# Patient Record
Sex: FEMALE | Race: WHITE | NOT HISPANIC OR LATINO | Employment: OTHER | URBAN - METROPOLITAN AREA
[De-identification: names, ages, dates, MRNs, and addresses within clinical notes are randomized per-mention and may not be internally consistent; named-entity substitution may affect disease eponyms.]

---

## 2019-06-24 RX ORDER — MEMANTINE HYDROCHLORIDE 10 MG/1
10 TABLET ORAL DAILY
OUTPATIENT
Start: 2019-06-24

## 2019-06-24 RX ORDER — MEMANTINE HYDROCHLORIDE 10 MG/1
10 TABLET ORAL 2 TIMES DAILY
COMMUNITY

## 2019-06-26 ENCOUNTER — LAB REQUISITION (OUTPATIENT)
Dept: LAB | Facility: HOSPITAL | Age: 84
End: 2019-06-26

## 2019-06-26 DIAGNOSIS — Z00.00 ROUTINE GENERAL MEDICAL EXAMINATION AT A HEALTH CARE FACILITY: ICD-10-CM

## 2019-06-26 LAB — NT-PROBNP SERPL-MCNC: 445.3 PG/ML (ref 5–1800)

## 2019-06-26 PROCEDURE — 83880 ASSAY OF NATRIURETIC PEPTIDE: CPT

## 2019-08-06 ENCOUNTER — HOSPITAL ENCOUNTER (OUTPATIENT)
Facility: HOSPITAL | Age: 84
Setting detail: OBSERVATION
Discharge: NURSING, REHAB OR LONG-TERM CARE | End: 2019-08-07
Attending: EMERGENCY MEDICINE | Admitting: INTERNAL MEDICINE

## 2019-08-06 ENCOUNTER — APPOINTMENT (OUTPATIENT)
Dept: GENERAL RADIOLOGY | Facility: HOSPITAL | Age: 84
End: 2019-08-06

## 2019-08-06 DIAGNOSIS — R07.2 PRECORDIAL PAIN: Primary | ICD-10-CM

## 2019-08-06 LAB
ALBUMIN SERPL-MCNC: 3.7 G/DL (ref 3.5–4.8)
ALBUMIN/GLOB SERPL: 1.3 G/DL (ref 1–1.7)
ALP SERPL-CCNC: 112 U/L (ref 32–91)
ALT SERPL W P-5'-P-CCNC: 13 U/L (ref 14–54)
ANION GAP SERPL CALCULATED.3IONS-SCNC: 15.9 MMOL/L (ref 5–15)
AST SERPL-CCNC: 20 U/L (ref 15–41)
BASOPHILS # BLD AUTO: 0.1 10*3/MM3 (ref 0–0.2)
BASOPHILS NFR BLD AUTO: 0.8 % (ref 0–1.5)
BILIRUB SERPL-MCNC: 0.9 MG/DL (ref 0.3–1.2)
BUN BLD-MCNC: 21 MG/DL (ref 8–20)
BUN/CREAT SERPL: 19.1 (ref 5.4–26.2)
CALCIUM SPEC-SCNC: 8.9 MG/DL (ref 8.9–10.3)
CHLORIDE SERPL-SCNC: 97 MMOL/L (ref 101–111)
CO2 SERPL-SCNC: 25 MMOL/L (ref 22–32)
CREAT BLD-MCNC: 1.1 MG/DL (ref 0.4–1)
DEPRECATED RDW RBC AUTO: 47.3 FL (ref 37–54)
EOSINOPHIL # BLD AUTO: 0.3 10*3/MM3 (ref 0–0.4)
EOSINOPHIL NFR BLD AUTO: 4.5 % (ref 0.3–6.2)
ERYTHROCYTE [DISTWIDTH] IN BLOOD BY AUTOMATED COUNT: 14.6 % (ref 12.3–15.4)
GFR SERPL CREATININE-BSD FRML MDRD: 46 ML/MIN/1.73
GLOBULIN UR ELPH-MCNC: 2.8 GM/DL (ref 2.5–3.8)
GLUCOSE BLD-MCNC: 117 MG/DL (ref 65–99)
GLUCOSE BLDC GLUCOMTR-MCNC: 185 MG/DL (ref 70–105)
HCT VFR BLD AUTO: 47.8 % (ref 34–46.6)
HGB BLD-MCNC: 16.3 G/DL (ref 12–15.9)
INR PPP: 2.19 (ref 2–3)
LYMPHOCYTES # BLD AUTO: 1.5 10*3/MM3 (ref 0.7–3.1)
LYMPHOCYTES NFR BLD AUTO: 21.9 % (ref 19.6–45.3)
MCH RBC QN AUTO: 31.6 PG (ref 26.6–33)
MCHC RBC AUTO-ENTMCNC: 34.1 G/DL (ref 31.5–35.7)
MCV RBC AUTO: 92.8 FL (ref 79–97)
MONOCYTES # BLD AUTO: 0.5 10*3/MM3 (ref 0.1–0.9)
MONOCYTES NFR BLD AUTO: 7.3 % (ref 5–12)
NEUTROPHILS # BLD AUTO: 4.6 10*3/MM3 (ref 1.7–7)
NEUTROPHILS NFR BLD AUTO: 65.5 % (ref 42.7–76)
NRBC BLD AUTO-RTO: 0 /100 WBC (ref 0–0.2)
PLATELET # BLD AUTO: 192 10*3/MM3 (ref 140–450)
PMV BLD AUTO: 8 FL (ref 6–12)
POTASSIUM BLD-SCNC: 3.9 MMOL/L (ref 3.6–5.1)
PROT SERPL-MCNC: 6.5 G/DL (ref 6.1–7.9)
PROTHROMBIN TIME: 21.1 SECONDS (ref 19.4–28.5)
RBC # BLD AUTO: 5.15 10*6/MM3 (ref 3.77–5.28)
SODIUM BLD-SCNC: 134 MMOL/L (ref 136–144)
TROPONIN I SERPL-MCNC: <0.03 NG/ML (ref 0–0.03)
WBC NRBC COR # BLD: 7 10*3/MM3 (ref 3.4–10.8)

## 2019-08-06 PROCEDURE — 99284 EMERGENCY DEPT VISIT MOD MDM: CPT

## 2019-08-06 PROCEDURE — G0378 HOSPITAL OBSERVATION PER HR: HCPCS

## 2019-08-06 PROCEDURE — 80053 COMPREHEN METABOLIC PANEL: CPT | Performed by: EMERGENCY MEDICINE

## 2019-08-06 PROCEDURE — 99219 PR INITIAL OBSERVATION CARE/DAY 50 MINUTES: CPT | Performed by: INTERNAL MEDICINE

## 2019-08-06 PROCEDURE — 93010 ELECTROCARDIOGRAM REPORT: CPT | Performed by: INTERNAL MEDICINE

## 2019-08-06 PROCEDURE — 93005 ELECTROCARDIOGRAM TRACING: CPT | Performed by: EMERGENCY MEDICINE

## 2019-08-06 PROCEDURE — 85610 PROTHROMBIN TIME: CPT | Performed by: INTERNAL MEDICINE

## 2019-08-06 PROCEDURE — 82962 GLUCOSE BLOOD TEST: CPT

## 2019-08-06 PROCEDURE — 85025 COMPLETE CBC W/AUTO DIFF WBC: CPT | Performed by: EMERGENCY MEDICINE

## 2019-08-06 PROCEDURE — 84484 ASSAY OF TROPONIN QUANT: CPT | Performed by: EMERGENCY MEDICINE

## 2019-08-06 PROCEDURE — 71045 X-RAY EXAM CHEST 1 VIEW: CPT

## 2019-08-06 RX ORDER — ACETAMINOPHEN 325 MG/1
650 TABLET ORAL EVERY 6 HOURS PRN
Status: DISCONTINUED | OUTPATIENT
Start: 2019-08-06 | End: 2019-08-07 | Stop reason: HOSPADM

## 2019-08-06 RX ORDER — FAMOTIDINE 20 MG/1
20 TABLET, FILM COATED ORAL 2 TIMES DAILY
Status: DISCONTINUED | OUTPATIENT
Start: 2019-08-06 | End: 2019-08-07

## 2019-08-06 RX ORDER — LEVOTHYROXINE SODIUM 0.05 MG/1
50 TABLET ORAL DAILY
COMMUNITY

## 2019-08-06 RX ORDER — LEVOTHYROXINE SODIUM 0.05 MG/1
50 TABLET ORAL
Status: DISCONTINUED | OUTPATIENT
Start: 2019-08-07 | End: 2019-08-07 | Stop reason: HOSPADM

## 2019-08-06 RX ORDER — WARFARIN SODIUM 4 MG/1
4 TABLET ORAL
Status: DISCONTINUED | OUTPATIENT
Start: 2019-08-06 | End: 2019-08-07

## 2019-08-06 RX ORDER — DONEPEZIL HYDROCHLORIDE 5 MG/1
5 TABLET, FILM COATED ORAL NIGHTLY
COMMUNITY

## 2019-08-06 RX ORDER — SODIUM CHLORIDE 0.9 % (FLUSH) 0.9 %
3 SYRINGE (ML) INJECTION EVERY 12 HOURS SCHEDULED
Status: DISCONTINUED | OUTPATIENT
Start: 2019-08-06 | End: 2019-08-07 | Stop reason: HOSPADM

## 2019-08-06 RX ORDER — ACETAMINOPHEN 325 MG/1
650 TABLET ORAL EVERY 6 HOURS PRN
COMMUNITY

## 2019-08-06 RX ORDER — WARFARIN SODIUM 4 MG/1
4 TABLET ORAL
COMMUNITY

## 2019-08-06 RX ORDER — FUROSEMIDE 40 MG/1
40 TABLET ORAL DAILY
COMMUNITY

## 2019-08-06 RX ORDER — MEMANTINE HYDROCHLORIDE 10 MG/1
10 TABLET ORAL EVERY 24 HOURS
Status: DISCONTINUED | OUTPATIENT
Start: 2019-08-07 | End: 2019-08-07 | Stop reason: HOSPADM

## 2019-08-06 RX ORDER — ASPIRIN 81 MG/1
324 TABLET, CHEWABLE ORAL DAILY
Status: DISCONTINUED | OUTPATIENT
Start: 2019-08-06 | End: 2019-08-07 | Stop reason: HOSPADM

## 2019-08-06 RX ORDER — ASCORBIC ACID 500 MG
1000 TABLET ORAL DAILY
COMMUNITY

## 2019-08-06 RX ORDER — DOCUSATE SODIUM 100 MG/1
100 CAPSULE, LIQUID FILLED ORAL 2 TIMES DAILY PRN
Status: DISCONTINUED | OUTPATIENT
Start: 2019-08-06 | End: 2019-08-07 | Stop reason: HOSPADM

## 2019-08-06 RX ORDER — METOPROLOL SUCCINATE 50 MG/1
50 TABLET, EXTENDED RELEASE ORAL
Status: DISCONTINUED | OUTPATIENT
Start: 2019-08-07 | End: 2019-08-07 | Stop reason: HOSPADM

## 2019-08-06 RX ORDER — METOPROLOL SUCCINATE 50 MG/1
50 TABLET, EXTENDED RELEASE ORAL DAILY
COMMUNITY

## 2019-08-06 RX ORDER — POLYETHYLENE GLYCOL 3350 17 G/17G
17 POWDER, FOR SOLUTION ORAL DAILY PRN
COMMUNITY

## 2019-08-06 RX ORDER — LORATADINE 10 MG/1
10 TABLET ORAL DAILY
COMMUNITY

## 2019-08-06 RX ORDER — SODIUM CHLORIDE 0.9 % (FLUSH) 0.9 %
3-10 SYRINGE (ML) INJECTION AS NEEDED
Status: DISCONTINUED | OUTPATIENT
Start: 2019-08-06 | End: 2019-08-07 | Stop reason: HOSPADM

## 2019-08-06 RX ORDER — FUROSEMIDE 40 MG/1
40 TABLET ORAL DAILY
Status: DISCONTINUED | OUTPATIENT
Start: 2019-08-07 | End: 2019-08-07 | Stop reason: HOSPADM

## 2019-08-06 RX ORDER — WARFARIN SODIUM 4 MG/1
4 TABLET ORAL
Status: DISCONTINUED | OUTPATIENT
Start: 2019-08-07 | End: 2019-08-07 | Stop reason: HOSPADM

## 2019-08-06 RX ORDER — WARFARIN SODIUM 5 MG/1
5 TABLET ORAL
Status: DISCONTINUED | OUTPATIENT
Start: 2019-08-06 | End: 2019-08-06

## 2019-08-06 RX ADMIN — FAMOTIDINE 20 MG: 20 TABLET, FILM COATED ORAL at 20:59

## 2019-08-06 RX ADMIN — ASPIRIN 81 MG 324 MG: 81 TABLET ORAL at 17:55

## 2019-08-06 RX ADMIN — Medication 3 ML: at 21:01

## 2019-08-06 NOTE — H&P
"[facility] HOSPITALIST     Nellie Mcclendon MD    CHIEF COMPLAINT:     CHEST PAIN    HISTORY OF PRESENT ILLNESS:    pT C/O 1 HR OF 10/10 EPIGASTRIC PAIN EARLIER TODAY. nO AGGRAVATING OR ALLEVIATING FACTORS. pOS soa, no n/v, nO DIAPHORESIS. pT MAY HAVE H/O AFIB      No past medical history on file.  No past surgical history on file.  No family history on file.  Social History     Tobacco Use   • Smoking status: Not on file   Substance Use Topics   • Alcohol use: Not on file   • Drug use: Not on file       (Not in a hospital admission)  Allergies:  Nitrofurantoin      There is no immunization history on file for this patient.        REVIEW OF SYSTEMS:  Please see the above history of present illness for pertinent positives and negatives.  The remainder of the patient's systems have been reviewed and are negative.   Sh- nO TOB OR ALCOHOL  FH- none per pt    Vital Signs  Temp:  [97.7 °F (36.5 °C)] 97.7 °F (36.5 °C)  Heart Rate:  [62] 62  Resp:  [15] 15  BP: (156-173)/(70-79) 156/70    Flowsheet Rows      First Filed Value   Admission Height  170.2 cm (67\") Documented at 08/06/2019 1256   Admission Weight  68 kg (150 lb) Documented at 08/06/2019 1256           Physical Exam:  Physical Exam   Constitutional: Patient appears well-developed and well-nourished and in no acute distress     HEENT:   Head: Normocephalic and atraumatic.   Eyes:  Pupils are equal, round, and reactive to light. EOM are intact. Sclera are anicteric and non-injected.  Mouth and Throat: Patient has moist mucous membranes. Oropharynx is clear of any erythema or exudate.       Neck: Neck supple.  No thyromegaly present. No lymphadenopathy present. No  masses.     Cardiovascular: Inspection: No JVD present. Palpation: No parasternal heave. Pedal pulses +1 bilaterally. No leg edema. Auscultation: Regular rate, regular rhythm, S1 normal and S2 normal. reveals no gallop and no friction rub. No Carotid bruit " bilaterally.    Pulmonary/Chest: Inspection: No distress, no use of accessory muscles. Lungs are clear to auscultation bilaterally. No respiratory distress. No wheezes. No rhonchi. No rales.     Abdomen /Gastrointestinal: Inspection: no distension. Palpation: no masses, no organomegaly. Soft. There is no tenderness. Bowel sounds are normal.     Musculoskeletal: Normal Muscle tone. Age appropriate, no deformities.    Neurological: Patient is alert and oriented to person, place, and time. Cranial nerves II-XII are grossly intact with no focal deficits. Sensori-motor exam is normal. No cerebellar signs.    Skin: Skin is warm. No rash noted. Nails show no clubbing.  No cyanosis or erythema. No bruising.      Results Review:      Lab Results (most recent)     Procedure Component Value Units Date/Time    Troponin [[id number]]  (Normal) Collected:  08/06/19 1350    Specimen:  Blood Updated:  08/06/19 1426     Troponin I <0.030 ng/mL     Narrative:       Troponin I Reference Range:    0.00-0.03  Negative.  Repeat testing in 4-6 hours if clinically indicated.    0.04-0.29  Suspicious for myocardial injury. Serial measurements and clinical  correlation may be necessary to confirm or exclude diagnosis of acute  coronary syndrome.  Repeat testing in 4-6 hours if indicated.     >0.29 Consistent with myocardial injury.  Recommend clinical and laboratory correlation.     Results my be falsely decreased if patient taking Biotin.     Comprehensive Metabolic Panel [[id number]]  (Abnormal) Collected:  08/06/19 1350    Specimen:  Blood Updated:  08/06/19 1424     Glucose 117 mg/dL      BUN 21 mg/dL      Creatinine 1.10 mg/dL      Sodium 134 mmol/L      Potassium 3.9 mmol/L      Chloride 97 mmol/L      CO2 25.0 mmol/L      Calcium 8.9 mg/dL      Total Protein 6.5 g/dL      Albumin 3.70 g/dL      ALT (SGPT) 13 U/L      AST (SGOT) 20 U/L      Alkaline Phosphatase 112 U/L      Total Bilirubin 0.9 mg/dL      eGFR Non  Amer 46  mL/min/1.73      Globulin 2.8 gm/dL      A/G Ratio 1.3 g/dL      BUN/Creatinine Ratio 19.1     Anion Gap 15.9 mmol/L     Narrative:       The MDRD GFR formula is only valid for adults with stable renal function between ages 18 and 70.    CBC & Differential [[id number]] Collected:  08/06/19 1350    Specimen:  Blood Updated:  08/06/19 1413    Narrative:       The following orders were created for panel order CBC & Differential.  Procedure                               Abnormality         Status                     ---------                               -----------         ------                     CBC Auto Differential[[id number]]        Abnormal            Final result                 Please view results for these tests on the individual orders.    CBC Auto Differential [[id number]]  (Abnormal) Collected:  08/06/19 1350    Specimen:  Blood Updated:  08/06/19 1413     WBC 7.00 10*3/mm3      Comment: Result checked         RBC 5.15 10*6/mm3      Hemoglobin 16.3 g/dL      Hematocrit 47.8 %      MCV 92.8 fL      MCH 31.6 pg      MCHC 34.1 g/dL      RDW 14.6 %      RDW-SD 47.3 fl      MPV 8.0 fL      Platelets 192 10*3/mm3      Neutrophil % 65.5 %      Lymphocyte % 21.9 %      Monocyte % 7.3 %      Eosinophil % 4.5 %      Basophil % 0.8 %      Neutrophils, Absolute 4.60 10*3/mm3      Lymphocytes, Absolute 1.50 10*3/mm3      Monocytes, Absolute 0.50 10*3/mm3      Eosinophils, Absolute 0.30 10*3/mm3      Basophils, Absolute 0.10 10*3/mm3      nRBC 0.0 /100 WBC           Imaging Results (most recent)     Procedure Component Value Units Date/Time    XR Chest 1 View [[id number]] Collected:  08/06/19 1424     Updated:  08/06/19 1428    Narrative:       DATE OF EXAM:  8/6/2019 2:19 PM     PROCEDURE:  XR CHEST 1 VW-     INDICATIONS:  Chest pain and shortness of breath     COMPARISON:  No Comparisons Available     TECHNIQUE:   Single radiographic AP view of the chest was obtained.     FINDINGS:  Cardiac size is enlarged. The  pulmonary vascular markings of the chest  appear normal. Lungs appear clear with no acute infiltrates. There is  biapical pleural thickening present.        Impression:       Mild cardiomegaly with evidence of biapical pleural thickening. No acute  process identified.     Electronically Signed By-Man Andrews On:8/6/2019 2:26 PM  This report was finalized on [id number] by  Man Andrews, .          ECG/EMG Results (most recent)     Procedure Component Value Units Date/Time    ECG 12 Lead [[id number]] Collected:  08/06/19 1303     Updated:  08/06/19 1304    Narrative:       HEART RATE= 61  bpm  RR Interval= 988  ms  TN Interval= 214  ms  P Horizontal Axis= -25  deg  P Front Axis= 87  deg  QRSD Interval= 92  ms  QT Interval= 430  ms  QRS Axis= 79  deg  T Wave Axis= 56  deg  - ABNORMAL ECG -  Sinus rhythm  Borderline prolonged TN interval  Anterior infarct, old  Electronically Signed By:   Date and Time of Study: 2019-08-06 13:03:12          I reviewed the patient's new clinical results.    Assessment/Plan     Active Problems:    Precordial pain- card consult, asa, prn ntg   Hypothyroid- chk tsh  ?afib- chk INR and cont coumadin if therapeutic  S/p Latrice           I discussed the patients findings and my recommendations with patient    Herber Hoyt Jr., MD  08/06/19  3:55 PM    Time:

## 2019-08-06 NOTE — ED PROVIDER NOTES
Subjective   History of Present Illness  Context: 89-year-old female presents with chest pain.  She describes as mid chest very severe.  She states that lasted a few minutes.  She states that disease at this time.  She had no associated symptoms of shortness of breath nausea diaphoresis or radiation of pain.  She states she has never had any like this previously  Location: Chest  Quality: Pain  Duration: Shortly before arrival  Timing: Resolved at this time  Severity: Was severe   Modifying factors: None reported  Associated signs and symptoms:    Review of Systems   Constitutional: Negative for fever and unexpected weight change.   HENT: Positive for hearing loss. Negative for congestion and sore throat.    Eyes: Negative for pain.   Respiratory: Negative for cough and shortness of breath.    Cardiovascular: Positive for chest pain. Negative for leg swelling.   Gastrointestinal: Negative for abdominal pain, diarrhea and vomiting.   Endocrine: Negative for polydipsia.   Genitourinary: Negative for dysuria and urgency.   Musculoskeletal: Negative for back pain.   Skin: Negative for rash.   Neurological: Negative for dizziness, weakness and headaches.   Psychiatric/Behavioral: Negative for confusion.       No past medical history on file.  Hypertension atrial fibrillation hyperlipidemia dementia, reflux, hypothyroid  Allergies   Allergen Reactions   • Nitrofurantoin Nausea And Vomiting       No past surgical history on file.    No family history on file.    Social History     Socioeconomic History   • Marital status:      Spouse name: Not on file   • Number of children: Not on file   • Years of education: Not on file   • Highest education level: Not on file   Medications amlodipine vitamin C vitamin D Colace Aricept Lasix Synthroid Claritin Namenda Toprol GlycoLax and Coumadin        Objective   Physical Exam  89 y.o. female Generally well-developed well-nourished,  Pupils equal round react to light.   Extraocular muscles intact, sclera nonicteric  Oropharynx clear, mucous membranes moist,   neck supple no JVD  Cardiovascular regular rate and rhythm without murmur gallop rub appreciated,  Respiratory lungs clear with equal breath sounds bilaterally  Abdomen soft and nontender without mass rebound or guarding  Extremities without tenderness trace symmetric edema  Neurologic without obvious focal findings noted motor sensory grossly intact extremities  Psychiatric cooperative  Dermatologic no significant rash or bruising noted    Procedures           ED Course      Results for orders placed or performed during the hospital encounter of 08/06/19   Comprehensive Metabolic Panel   Result Value Ref Range    Glucose 117 (H) 65 - 99 mg/dL    BUN 21 (H) 8 - 20 mg/dL    Creatinine 1.10 (H) 0.40 - 1.00 mg/dL    Sodium 134 (L) 136 - 144 mmol/L    Potassium 3.9 3.6 - 5.1 mmol/L    Chloride 97 (L) 101 - 111 mmol/L    CO2 25.0 22.0 - 32.0 mmol/L    Calcium 8.9 8.9 - 10.3 mg/dL    Total Protein 6.5 6.1 - 7.9 g/dL    Albumin 3.70 3.50 - 4.80 g/dL    ALT (SGPT) 13 (L) 14 - 54 U/L    AST (SGOT) 20 15 - 41 U/L    Alkaline Phosphatase 112 (H) 32 - 91 U/L    Total Bilirubin 0.9 0.3 - 1.2 mg/dL    eGFR Non African Amer 46 (L) >60 mL/min/1.73    Globulin 2.8 2.5 - 3.8 gm/dL    A/G Ratio 1.3 1.0 - 1.7 g/dL    BUN/Creatinine Ratio 19.1 5.4 - 26.2    Anion Gap 15.9 (H) 5.0 - 15.0 mmol/L   Troponin   Result Value Ref Range    Troponin I <0.030 0.000 - 0.030 ng/mL   CBC Auto Differential   Result Value Ref Range    WBC 7.00 3.40 - 10.80 10*3/mm3    RBC 5.15 3.77 - 5.28 10*6/mm3    Hemoglobin 16.3 (H) 12.0 - 15.9 g/dL    Hematocrit 47.8 (H) 34.0 - 46.6 %    MCV 92.8 79.0 - 97.0 fL    MCH 31.6 26.6 - 33.0 pg    MCHC 34.1 31.5 - 35.7 g/dL    RDW 14.6 12.3 - 15.4 %    RDW-SD 47.3 37.0 - 54.0 fl    MPV 8.0 6.0 - 12.0 fL    Platelets 192 140 - 450 10*3/mm3    Neutrophil % 65.5 42.7 - 76.0 %    Lymphocyte % 21.9 19.6 - 45.3 %    Monocyte % 7.3 5.0 -  "12.0 %    Eosinophil % 4.5 0.3 - 6.2 %    Basophil % 0.8 0.0 - 1.5 %    Neutrophils, Absolute 4.60 1.70 - 7.00 10*3/mm3    Lymphocytes, Absolute 1.50 0.70 - 3.10 10*3/mm3    Monocytes, Absolute 0.50 0.10 - 0.90 10*3/mm3    Eosinophils, Absolute 0.30 0.00 - 0.40 10*3/mm3    Basophils, Absolute 0.10 0.00 - 0.20 10*3/mm3    nRBC 0.0 0.0 - 0.2 /100 WBC     Xr Chest 1 View    Result Date: 8/6/2019  Mild cardiomegaly with evidence of biapical pleural thickening. No acute process identified.  Electronically Signed By-Man Andrews On:8/6/2019 2:26 PM This report was finalized on [id number] by  Man Andrews, .    Medications - No data to display  /79 (BP Location: Right arm, Patient Position: Sitting)   Pulse 62   Temp 97.7 °F (36.5 °C) (Oral)   Resp 15   Ht 170.2 cm (67\")   Wt 68 kg (150 lb)   SpO2 98%   BMI 23.49 kg/m²   No significant laboratory abnormality, negative troponin            MDM  Chart review: Most recent cardiology office visit last month she had been seen for dizziness low blood pressure.  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  Compared to previous rate decreased otherwise no significant change  Lab: No significant laboratory abnormality negative troponin  Radiology: I reviewed chest x-ray.  Mild cardiomegaly with biapical pleural thickening without evidence of acute cardiopulmonary abnormality Discussion/treatment: findings were discussed with patient family.  She was discussed with hospitalist.  She will brought in for observation.  She will have further cardiac testing as indicated      Final diagnoses:   Precordial pain            Victorino Gallegos MD  08/06/19 1518    "

## 2019-08-06 NOTE — ED NOTES
Episode of cp earlier this afternoon. Pt st she has never had cp before. Pt st she does not have any cp at this time or any other symptoms      Gabriella Rao RN  08/06/19 [id number]

## 2019-08-07 ENCOUNTER — APPOINTMENT (OUTPATIENT)
Dept: NUCLEAR MEDICINE | Facility: HOSPITAL | Age: 84
End: 2019-08-07

## 2019-08-07 VITALS
DIASTOLIC BLOOD PRESSURE: 79 MMHG | TEMPERATURE: 97.3 F | OXYGEN SATURATION: 95 % | RESPIRATION RATE: 16 BRPM | WEIGHT: 145.72 LBS | HEART RATE: 65 BPM | SYSTOLIC BLOOD PRESSURE: 159 MMHG | BODY MASS INDEX: 22.87 KG/M2 | HEIGHT: 67 IN

## 2019-08-07 LAB
ALBUMIN SERPL-MCNC: 3.2 G/DL (ref 3.5–4.8)
ALBUMIN/GLOB SERPL: 1.3 G/DL (ref 1–1.7)
ALP SERPL-CCNC: 99 U/L (ref 32–91)
ALT SERPL W P-5'-P-CCNC: 12 U/L (ref 14–54)
ANION GAP SERPL CALCULATED.3IONS-SCNC: 16.8 MMOL/L (ref 5–15)
AST SERPL-CCNC: 16 U/L (ref 15–41)
BASOPHILS # BLD MANUAL: 0.06 10*3/MM3 (ref 0–0.2)
BASOPHILS NFR BLD AUTO: 1 % (ref 0–1.5)
BILIRUB SERPL-MCNC: 1 MG/DL (ref 0.3–1.2)
BUN BLD-MCNC: 22 MG/DL (ref 8–20)
BUN/CREAT SERPL: 24.4 (ref 5.4–26.2)
CALCIUM SPEC-SCNC: 8.4 MG/DL (ref 8.9–10.3)
CHLORIDE SERPL-SCNC: 98 MMOL/L (ref 101–111)
CO2 SERPL-SCNC: 24 MMOL/L (ref 22–32)
CREAT BLD-MCNC: 0.9 MG/DL (ref 0.4–1)
DEPRECATED RDW RBC AUTO: 45.1 FL (ref 37–54)
EOSINOPHIL # BLD MANUAL: 0.24 10*3/MM3 (ref 0–0.4)
EOSINOPHIL NFR BLD MANUAL: 4 % (ref 0.3–6.2)
ERYTHROCYTE [DISTWIDTH] IN BLOOD BY AUTOMATED COUNT: 14.1 % (ref 12.3–15.4)
GFR SERPL CREATININE-BSD FRML MDRD: 58 ML/MIN/1.73
GLOBULIN UR ELPH-MCNC: 2.5 GM/DL (ref 2.5–3.8)
GLUCOSE BLD-MCNC: 122 MG/DL (ref 65–99)
GLUCOSE BLDC GLUCOMTR-MCNC: 124 MG/DL (ref 70–105)
HCT VFR BLD AUTO: 43.9 % (ref 34–46.6)
HGB BLD-MCNC: 15 G/DL (ref 12–15.9)
INR PPP: 2.07 (ref 2–3)
LYMPHOCYTES # BLD MANUAL: 1.83 10*3/MM3 (ref 0.7–3.1)
LYMPHOCYTES NFR BLD MANUAL: 30 % (ref 19.6–45.3)
LYMPHOCYTES NFR BLD MANUAL: 7 % (ref 5–12)
MCH RBC QN AUTO: 31.4 PG (ref 26.6–33)
MCHC RBC AUTO-ENTMCNC: 34.2 G/DL (ref 31.5–35.7)
MCV RBC AUTO: 91.9 FL (ref 79–97)
MONOCYTES # BLD AUTO: 0.43 10*3/MM3 (ref 0.1–0.9)
NEUTROPHILS # BLD AUTO: 3.48 10*3/MM3 (ref 1.7–7)
NEUTROPHILS NFR BLD MANUAL: 55 % (ref 42.7–76)
NEUTS BAND NFR BLD MANUAL: 2 % (ref 0–5)
PLAT MORPH BLD: NORMAL
PLATELET # BLD AUTO: 164 10*3/MM3 (ref 140–450)
PMV BLD AUTO: 8.5 FL (ref 6–12)
POTASSIUM BLD-SCNC: 3.8 MMOL/L (ref 3.6–5.1)
PROT SERPL-MCNC: 5.7 G/DL (ref 6.1–7.9)
PROTHROMBIN TIME: 20 SECONDS (ref 19.4–28.5)
RBC # BLD AUTO: 4.78 10*6/MM3 (ref 3.77–5.28)
RBC MORPH BLD: NORMAL
SCAN SLIDE: NORMAL
SODIUM BLD-SCNC: 135 MMOL/L (ref 136–144)
TROPONIN I SERPL-MCNC: <0.03 NG/ML (ref 0–0.03)
TROPONIN I SERPL-MCNC: <0.03 NG/ML (ref 0–0.03)
TSH SERPL DL<=0.05 MIU/L-ACNC: 3.26 MIU/ML (ref 0.34–5.6)
VARIANT LYMPHS NFR BLD MANUAL: 1 % (ref 0–5)
WBC MORPH BLD: NORMAL
WBC NRBC COR # BLD: 6.1 10*3/MM3 (ref 3.4–10.8)

## 2019-08-07 PROCEDURE — A9500 TC99M SESTAMIBI: HCPCS | Performed by: INTERNAL MEDICINE

## 2019-08-07 PROCEDURE — 85025 COMPLETE CBC W/AUTO DIFF WBC: CPT | Performed by: INTERNAL MEDICINE

## 2019-08-07 PROCEDURE — 84484 ASSAY OF TROPONIN QUANT: CPT | Performed by: INTERNAL MEDICINE

## 2019-08-07 PROCEDURE — 84484 ASSAY OF TROPONIN QUANT: CPT | Performed by: NURSE PRACTITIONER

## 2019-08-07 PROCEDURE — G0378 HOSPITAL OBSERVATION PER HR: HCPCS

## 2019-08-07 PROCEDURE — 93005 ELECTROCARDIOGRAM TRACING: CPT | Performed by: INTERNAL MEDICINE

## 2019-08-07 PROCEDURE — 93017 CV STRESS TEST TRACING ONLY: CPT

## 2019-08-07 PROCEDURE — 84443 ASSAY THYROID STIM HORMONE: CPT | Performed by: INTERNAL MEDICINE

## 2019-08-07 PROCEDURE — 85610 PROTHROMBIN TIME: CPT | Performed by: INTERNAL MEDICINE

## 2019-08-07 PROCEDURE — 99217 PR OBSERVATION CARE DISCHARGE MANAGEMENT: CPT | Performed by: INTERNAL MEDICINE

## 2019-08-07 PROCEDURE — 85007 BL SMEAR W/DIFF WBC COUNT: CPT | Performed by: INTERNAL MEDICINE

## 2019-08-07 PROCEDURE — 99203 OFFICE O/P NEW LOW 30 MIN: CPT | Performed by: INTERNAL MEDICINE

## 2019-08-07 PROCEDURE — 78452 HT MUSCLE IMAGE SPECT MULT: CPT

## 2019-08-07 PROCEDURE — 80053 COMPREHEN METABOLIC PANEL: CPT | Performed by: INTERNAL MEDICINE

## 2019-08-07 PROCEDURE — 82962 GLUCOSE BLOOD TEST: CPT

## 2019-08-07 PROCEDURE — 0 TECHNETIUM SESTAMIBI: Performed by: INTERNAL MEDICINE

## 2019-08-07 RX ORDER — NITROGLYCERIN 0.4 MG/1
0.4 TABLET SUBLINGUAL
Status: DISCONTINUED | OUTPATIENT
Start: 2019-08-07 | End: 2019-08-07 | Stop reason: HOSPADM

## 2019-08-07 RX ORDER — FAMOTIDINE 20 MG/1
20 TABLET, FILM COATED ORAL DAILY
Status: DISCONTINUED | OUTPATIENT
Start: 2019-08-08 | End: 2019-08-07 | Stop reason: HOSPADM

## 2019-08-07 RX ADMIN — TECHNETIUM TC 99M SESTAMIBI 1 DOSE: 1 INJECTION INTRAVENOUS at 10:45

## 2019-08-07 RX ADMIN — ASPIRIN 81 MG 324 MG: 81 TABLET ORAL at 09:00

## 2019-08-07 RX ADMIN — NITROGLYCERIN 0.4 MG: 0.4 TABLET SUBLINGUAL at 01:11

## 2019-08-07 RX ADMIN — MEMANTINE 10 MG: 10 TABLET ORAL at 09:00

## 2019-08-07 RX ADMIN — LEVOTHYROXINE SODIUM 50 MCG: 50 TABLET ORAL at 06:16

## 2019-08-07 RX ADMIN — Medication 3 ML: at 09:02

## 2019-08-07 RX ADMIN — FAMOTIDINE 20 MG: 20 TABLET, FILM COATED ORAL at 09:01

## 2019-08-07 RX ADMIN — FUROSEMIDE 40 MG: 40 TABLET ORAL at 09:00

## 2019-08-07 NOTE — DISCHARGE SUMMARY
Date of Admission: 8/6/2019    Date of Discharge:  8/7/2019    Length of stay:  LOS: 0 days     Discharge Diagnosis: chest pain    Presenting Problem/History of Present Illness  Active Hospital Problems    Diagnosis  POA   • **Precordial pain [R07.2]  Yes      Resolved Hospital Problems   No resolved problems to display.          Hospital Course  Patient is a 89 y.o. female presented with chest pain and ruled out for mi and cadiology said she could be discharged.      Past Medical History:     Past Medical History:   Diagnosis Date   • Acid reflux    • Arthritis    • Atrial fibrillation (CMS/HCC)    • Atrial fibrillation (CMS/HCC) unknown   • Dementia        Past Surgical History:   History reviewed. No pertinent surgical history.    Social History:   Social History     Socioeconomic History   • Marital status:      Spouse name: Not on file   • Number of children: Not on file   • Years of education: Not on file   • Highest education level: Not on file   Tobacco Use   • Smoking status: Never Smoker   • Smokeless tobacco: Never Used   Substance and Sexual Activity   • Alcohol use: No     Frequency: Never   • Drug use: No       Procedures Performed         Consults:   Consults     Date and Time Order Name Status Description    8/6/2019 1631 Inpatient Cardiology Consult Completed     8/6/2019 1519 Hospitalist (on-call MD unless specified) Completed           Pertinent Test Results:       Lab Results (most recent)     Procedure Component Value Units Date/Time    POC Glucose Once [[id number]]  (Abnormal) Collected:  08/07/19 0735    Specimen:  Blood Updated:  08/07/19 0736     Glucose 124 mg/dL      Comment: Serial Number: [id number]:  [id number]       Troponin [[id number]]  (Normal) Collected:  08/07/19 0428    Specimen:  Blood Updated:  08/07/19 0551     Troponin I <0.030 ng/mL     Narrative:       Troponin I Reference Range:    0.00-0.03  Negative.  Repeat testing in 4-6 hours if clinically  indicated.    0.04-0.29  Suspicious for myocardial injury. Serial measurements and clinical  correlation may be necessary to confirm or exclude diagnosis of acute  coronary syndrome.  Repeat testing in 4-6 hours if indicated.     >0.29 Consistent with myocardial injury.  Recommend clinical and laboratory correlation.     Results my be falsely decreased if patient taking Biotin.     CBC & Differential [[id number]] Collected:  08/07/19 0120    Specimen:  Blood Updated:  08/07/19 0224    Narrative:       The following orders were created for panel order CBC & Differential.  Procedure                               Abnormality         Status                     ---------                               -----------         ------                     Scan Slide[[id number]]                                       Final result               CBC Auto Differential[[id number]]        Normal              Final result                 Please view results for these tests on the individual orders.    CBC Auto Differential [[id number]]  (Normal) Collected:  08/07/19 0120    Specimen:  Blood Updated:  08/07/19 0224     WBC 6.10 10*3/mm3      RBC 4.78 10*6/mm3      Hemoglobin 15.0 g/dL      Hematocrit 43.9 %      MCV 91.9 fL      MCH 31.4 pg      MCHC 34.2 g/dL      RDW 14.1 %      RDW-SD 45.1 fl      MPV 8.5 fL      Platelets 164 10*3/mm3     Scan Slide [[id number]] Collected:  08/07/19 0120    Specimen:  Blood Updated:  08/07/19 0224     Scan Slide --     Comment: See Manual Differential Results       Manual Differential [[id number]]  (Normal) Collected:  08/07/19 0120    Specimen:  Blood Updated:  08/07/19 0224     Neutrophil % 55.0 %      Lymphocyte % 30.0 %      Monocyte % 7.0 %      Eosinophil % 4.0 %      Basophil % 1.0 %      Bands %  2.0 %      Atypical Lymphocyte % 1.0 %      Neutrophils Absolute 3.48 10*3/mm3      Lymphocytes Absolute 1.83 10*3/mm3      Monocytes Absolute 0.43 10*3/mm3      Eosinophils Absolute 0.24 10*3/mm3       Basophils Absolute 0.06 10*3/mm3      RBC Morphology Normal     WBC Morphology Normal     Platelet Morphology Normal    TSH [[id number]]  (Normal) Collected:  08/07/19 0120    Specimen:  Blood Updated:  08/07/19 0212     TSH 3.260 mIU/mL      Comment: Results may be falsely decreased if patient taking Biotin.       Troponin [[id number]]  (Normal) Collected:  08/07/19 0120    Specimen:  Blood Updated:  08/07/19 0200     Troponin I <0.030 ng/mL     Narrative:       Troponin I Reference Range:    0.00-0.03  Negative.  Repeat testing in 4-6 hours if clinically indicated.    0.04-0.29  Suspicious for myocardial injury. Serial measurements and clinical  correlation may be necessary to confirm or exclude diagnosis of acute  coronary syndrome.  Repeat testing in 4-6 hours if indicated.     >0.29 Consistent with myocardial injury.  Recommend clinical and laboratory correlation.     Results my be falsely decreased if patient taking Biotin.     Comprehensive Metabolic Panel [[id number]]  (Abnormal) Collected:  08/07/19 0120    Specimen:  Blood Updated:  08/07/19 0200     Glucose 122 mg/dL      BUN 22 mg/dL      Creatinine 0.90 mg/dL      Sodium 135 mmol/L      Potassium 3.8 mmol/L      Chloride 98 mmol/L      CO2 24.0 mmol/L      Calcium 8.4 mg/dL      Total Protein 5.7 g/dL      Albumin 3.20 g/dL      ALT (SGPT) 12 U/L      AST (SGOT) 16 U/L      Alkaline Phosphatase 99 U/L      Total Bilirubin 1.0 mg/dL      eGFR Non African Amer 58 mL/min/1.73      Globulin 2.5 gm/dL      A/G Ratio 1.3 g/dL      BUN/Creatinine Ratio 24.4     Anion Gap 16.8 mmol/L     Narrative:       The MDRD GFR formula is only valid for adults with stable renal function between ages 18 and 70.    Protime-INR [[id number]]  (Normal) Collected:  08/07/19 0120    Specimen:  Blood Updated:  08/07/19 0143     Protime 20.0 Seconds      INR 2.07    POC Glucose Once [[id number]]  (Abnormal) Collected:  08/06/19 1955    Specimen:  Blood Updated:  08/06/19 1956      Glucose 185 mg/dL      Comment: Serial Number: [id number]:  [id number]       Protime-INR [[id number]]  (Normal) Collected:  08/06/19 1732    Specimen:  Blood Updated:  08/06/19 1942     Protime 21.1 Seconds      INR 2.19    Comprehensive Metabolic Panel [[id number]]  (Abnormal) Collected:  08/06/19 1350    Specimen:  Blood Updated:  08/06/19 1424     Glucose 117 mg/dL      BUN 21 mg/dL      Creatinine 1.10 mg/dL      Sodium 134 mmol/L      Potassium 3.9 mmol/L      Chloride 97 mmol/L      CO2 25.0 mmol/L      Calcium 8.9 mg/dL      Total Protein 6.5 g/dL      Albumin 3.70 g/dL      ALT (SGPT) 13 U/L      AST (SGOT) 20 U/L      Alkaline Phosphatase 112 U/L      Total Bilirubin 0.9 mg/dL      eGFR Non African Amer 46 mL/min/1.73      Globulin 2.8 gm/dL      A/G Ratio 1.3 g/dL      BUN/Creatinine Ratio 19.1     Anion Gap 15.9 mmol/L     Narrative:       The MDRD GFR formula is only valid for adults with stable renal function between ages 18 and 70.    CBC & Differential [[id number]] Collected:  08/06/19 1350    Specimen:  Blood Updated:  08/06/19 1413    Narrative:       The following orders were created for panel order CBC & Differential.  Procedure                               Abnormality         Status                     ---------                               -----------         ------                     CBC Auto Differential[[id number]]        Abnormal            Final result                 Please view results for these tests on the individual orders.    CBC Auto Differential [[id number]]  (Abnormal) Collected:  08/06/19 1350    Specimen:  Blood Updated:  08/06/19 1413     WBC 7.00 10*3/mm3      Comment: Result checked         RBC 5.15 10*6/mm3      Hemoglobin 16.3 g/dL      Hematocrit 47.8 %      MCV 92.8 fL      MCH 31.6 pg      MCHC 34.1 g/dL      RDW 14.6 %      RDW-SD 47.3 fl      MPV 8.0 fL      Platelets 192 10*3/mm3      Neutrophil % 65.5 %      Lymphocyte % 21.9 %      Monocyte % 7.3 %       Eosinophil % 4.5 %      Basophil % 0.8 %      Neutrophils, Absolute 4.60 10*3/mm3      Lymphocytes, Absolute 1.50 10*3/mm3      Monocytes, Absolute 0.50 10*3/mm3      Eosinophils, Absolute 0.30 10*3/mm3      Basophils, Absolute 0.10 10*3/mm3      nRBC 0.0 /100 WBC                   Imaging Results (most recent)     Procedure Component Value Units Date/Time    XR Chest 1 View [[id number]] Collected:  08/06/19 1424     Updated:  08/06/19 1428    Narrative:       DATE OF EXAM:  8/6/2019 2:19 PM     PROCEDURE:  XR CHEST 1 VW-     INDICATIONS:  Chest pain and shortness of breath     COMPARISON:  No Comparisons Available     TECHNIQUE:   Single radiographic AP view of the chest was obtained.     FINDINGS:  Cardiac size is enlarged. The pulmonary vascular markings of the chest  appear normal. Lungs appear clear with no acute infiltrates. There is  biapical pleural thickening present.        Impression:       Mild cardiomegaly with evidence of biapical pleural thickening. No acute  process identified.     Electronically Signed By-Man Andrews On:8/6/2019 2:26 PM  This report was finalized on [id number] by  Man Andrews, .          Condition on Discharge:  good    Vital Signs  Temp:  [97.3 °F (36.3 °C)-97.7 °F (36.5 °C)] 97.3 °F (36.3 °C)  Heart Rate:  [61-75] 65  Resp:  [14-17] 16  BP: (147-175)/(70-82) 159/79    Physical Exam:     General Appearance:    Alert, cooperative, in no acute distress   Lungs:     Clear to auscultation,respirations regular, even and                  unlabored    Heart:    Regular rhythm and normal rate, normal S1 and S2, no            murmur, no gallop, no rub, no click   Abdomen:     Normal bowel sounds, no masses, no organomegaly, soft        non-tender, non-distended, no guarding, no rebound                tenderness   Extremities:   Moves all extremities well, no edema, no cyanosis, no             redness       Discharge Disposition  ECF    Discharge Medications     Discharge Medications       Continue These Medications      Instructions Start Date   acetaminophen 325 MG tablet  Commonly known as:  TYLENOL   650 mg, Oral, Every 6 Hours PRN      Cholecalciferol 1000 units tablet   1,000 Units, Oral, Daily      docusate sodium 50 MG capsule  Commonly known as:  COLACE   Oral, Daily      donepezil 5 MG tablet  Commonly known as:  ARICEPT   5 mg, Oral, Nightly      furosemide 40 MG tablet  Commonly known as:  LASIX   40 mg, Oral, Daily      levothyroxine 50 MCG tablet  Commonly known as:  SYNTHROID, LEVOTHROID   50 mcg, Oral, Daily      loratadine 10 MG tablet  Commonly known as:  CLARITIN   10 mg, Oral, Daily      metoprolol succinate XL 50 MG 24 hr tablet  Commonly known as:  TOPROL-XL   50 mg, Oral, Daily      NAMENDA 10 MG tablet  Generic drug:  memantine   10 mg, Oral, 2 Times Daily      polyethylene glycol packet  Commonly known as:  MIRALAX   17 g, Oral, Daily PRN      vitamin C 500 MG tablet  Commonly known as:  ASCORBIC ACID   1,000 mg, Oral, Daily      warfarin 4 MG tablet  Commonly known as:  COUMADIN   4 mg, Oral, Daily Warfarin             Discharge Diet: healthy heart      Activity at Discharge: as tolerated      Follow-up Appointments  No future appointments.      Test Results Pending at Discharge       Risk for Readmission (LACE) Score: 1 (8/7/2019  6:01 AM)          Herber Hoyt Jr., MD  08/07/19  12:28 PM    Time:

## 2019-08-07 NOTE — CONSULTS
CARDIOLOGY CONSULT NOTE      Referring Provider: Dr. Mcclendon    Reason for Consultation: Chest pain    Attending: Herber Hoyt Jr., MD    Chief complaint    ?  Chest pain; patient unable to provide meaningful historical data    Subjective .     History of present illness:  Peggy Hopkins is a 89 y.o. female nursing home resident with mild to moderate dementia who reported chest pain yesterday morning and was brought to the ED for further evaluation and treatment.    This patient has a questionable history of paroxysmal atrial fibrillation and has seen her regular cardiologist (Dr. Mau Le) only 2 weeks ago who felt she was in stable and satisfactory condition and ordered no additional testing or therapy.  The patient has no prior history of ischemic coronary events and is not known to have angina pectoris.  Her son (POA) denies any prior history of ischemic coronary events or previous evaluation for CAD valvular heart disease or cardiomyopathy.  He points out that she is mild to moderately demented though becoming progressively so over recent months and is much worse in the hospital in these unfamiliar surroundings.    The son makes it perfectly clear that he nor his mother wish to have any aggressive or invasive evaluation or treatments.  She is an established DNR.  He does not wish to have Myoview examination or any further specific cardiac testing.  He recognizes that she is not capable of providing any meaningful historical data.  She answers all of my clinical questions uniformly with a yes.  Her son recognizes that she is giving completely different answers to questions compared to earlier this morning and compared to yesterday in the emergency room.    Since admission, her EKG has shown normal sinus rhythm with no ischemic ST nor T wave abnormalities and her troponin values are less than 0.03×2.    Review of Systems   Patient is unable to provide any meaningful historical data with regards to  review of systems    History  Past Medical History:   Diagnosis Date   • Acid reflux    • Arthritis    • Atrial fibrillation (CMS/HCC)    • Atrial fibrillation (CMS/HCC) unknown   • Dementia        History reviewed. No pertinent surgical history.    History reviewed. No pertinent family history.    Social History     Tobacco Use   • Smoking status: Never Smoker   • Smokeless tobacco: Never Used   Substance Use Topics   • Alcohol use: No     Frequency: Never   • Drug use: No        Medications Prior to Admission   Medication Sig Dispense Refill Last Dose   • acetaminophen (TYLENOL) 325 MG tablet Take 650 mg by mouth Every 6 (Six) Hours As Needed for Mild Pain .      • Cholecalciferol 1000 units tablet Take 1,000 Units by mouth Daily.   8/6/2019 at Unknown time   • docusate sodium (COLACE) 50 MG capsule Take  by mouth Daily.   8/6/2019 at Unknown time   • donepezil (ARICEPT) 5 MG tablet Take 5 mg by mouth Every Night.   8/5/2019 at Unknown time   • furosemide (LASIX) 40 MG tablet Take 40 mg by mouth Daily.   8/6/2019 at Unknown time   • levothyroxine (SYNTHROID, LEVOTHROID) 50 MCG tablet Take 50 mcg by mouth Daily.   8/6/2019 at Unknown time   • loratadine (CLARITIN) 10 MG tablet Take 10 mg by mouth Daily.   8/6/2019 at Unknown time   • metoprolol succinate XL (TOPROL-XL) 50 MG 24 hr tablet Take 50 mg by mouth Daily.   8/6/2019 at Unknown time   • polyethylene glycol (MIRALAX) packet Take 17 g by mouth Daily As Needed.      • vitamin C (ASCORBIC ACID) 500 MG tablet Take 1,000 mg by mouth Daily.   8/6/2019 at Unknown time   • warfarin (COUMADIN) 4 MG tablet Take 4 mg by mouth Daily.      • memantine (NAMENDA) 10 MG tablet Take 10 mg by mouth 2 (Two) Times a Day.            Nitrofurantoin    Scheduled Meds:    aspirin 324 mg Oral Daily   [START ON 8/8/2019] famotidine 20 mg Oral Daily   furosemide 40 mg Oral Daily   levothyroxine 50 mcg Oral Q AM   memantine 10 mg Oral Q24H   metoprolol succinate XL 50 mg Oral Q24H  "  sodium chloride 3 mL Intravenous Q12H   warfarin 4 mg Oral Daily     Continuous Infusions:    Pharmacy to dose warfarin      PRN Meds:.•  acetaminophen  •  docusate sodium  •  nitroglycerin  •  Pharmacy to dose warfarin  •  sodium chloride    Objective     VITAL SIGNS  Vitals:    08/07/19 0111 08/07/19 0114 08/07/19 0258 08/07/19 0855   BP: 175/81 147/75 153/81 159/79   BP Location: Left arm Left arm Right arm    Patient Position: Lying Lying Lying    Pulse: 67 75 61 65   Resp:   17 16   Temp:   97.3 °F (36.3 °C)    TempSrc:   Oral    SpO2: 95% 94% 93% 95%   Weight:   66.1 kg (145 lb 11.6 oz)    Height:           Flowsheet Rows      First Filed Value   Admission Height  170.2 cm (67\") Documented at 08/06/2019 1256   Admission Weight  68 kg (150 lb) Documented at 08/06/2019 1256           TELEMETRY: Sinus rhythm    Physical Exam:  General Appearance:    Alert, cooperative, in no acute distress, though pleasantly confused   Head:    Normocephalic, without obvious abnormality, atraumatic   Eyes:            Lids and lashes normal, conjunctivae and sclerae normal, no   icterus, no pallor, corneas clear, PERRLA   Ears:    Ears appear intact with no abnormalities noted   Oropharynx:   No oral lesions, no thrush, oral mucosa moist   Neck:   No adenopathy, supple, trachea midline, no thyromegaly, no   carotid bruit, no JVD   Lungs:     Clear to auscultation,respirations regular, even and                  unlabored    Heart:    Regular rhythm and normal rate, normal S1 and S2, no            murmur, no gallop, no rub, no click   Chest Wall/Thorax:    No abnormalities observed   Abdomen:     Normal bowel sounds, no masses, no organomegaly, soft        non-tender, non-distended, no guarding, no rebound                tenderness   Rectal:     Deferred   Extremities:   Moves all extremities well, no edema, no cyanosis, no             redness: No brawny changes noted over anterior pretibial areas bilaterally   Pulses:   Pulses " are diminished though minimally palpable and equal bilaterally   Skin:   No bleeding, bruising or rash   Lymph nodes:   No palpable adenopathy   Neurologic:   Cranial nerves 2 - 12 grossly intact, no gross focal or lateralizing sensory or motor deficits detected on limited exam.  Station and gait were not tested.              Results Review:    CBC    Results from last 7 days   Lab Units 08/07/19  0120 08/06/19  1350   WBC 10*3/mm3 6.10 7.00   HEMOGLOBIN g/dL 15.0 16.3*   PLATELETS 10*3/mm3 164 192     BMP   Results from last 7 days   Lab Units 08/07/19  0120 08/06/19  1350   SODIUM mmol/L 135* 134*   POTASSIUM mmol/L 3.8 3.9   CHLORIDE mmol/L 98* 97*   CO2 mmol/L 24.0 25.0   BUN mg/dL 22* 21*   CREATININE mg/dL 0.90 1.10*   GLUCOSE mg/dL 122* 117*     Coag   Results from last 7 days   Lab Units 08/07/19  0120 08/06/19  1732   INR  2.07 2.19     HbA1C No results found for: HGBA1C  Blood Glucose   Glucose   Date/Time Value Ref Range Status   08/07/2019 0735 124 (H) 70 - 105 mg/dL Final     Comment:     Serial Number: [id number]:  [id number]   08/06/2019 1955 185 (H) 70 - 105 mg/dL Final     Comment:     Serial Number: [id number]:  [id number]     CMP   Results from last 7 days   Lab Units 08/07/19  0120 08/06/19  1350   SODIUM mmol/L 135* 134*   POTASSIUM mmol/L 3.8 3.9   CHLORIDE mmol/L 98* 97*   CO2 mmol/L 24.0 25.0   BUN mg/dL 22* 21*   CREATININE mg/dL 0.90 1.10*   GLUCOSE mg/dL 122* 117*   ALBUMIN g/dL 3.20* 3.70   BILIRUBIN mg/dL 1.0 0.9   ALK PHOS U/L 99* 112*   AST (SGOT) U/L 16 20   ALT (SGPT) U/L 12* 13*     ABG      Radiology(recent) Xr Chest 1 View    Result Date: 8/6/2019  Mild cardiomegaly with evidence of biapical pleural thickening. No acute process identified.  Electronically Signed By-Man Andrews On:8/6/2019 2:26 PM This report was finalized on [id number] by  Man Andrews, .      EKG-sinus rhythm with no ischemic ST or T wave normalities      OTHER:         Assessment/Plan     #1  89-year-old demented nursing home patient with equivocal reports of chest discomfort has ruled out for ACS and is currently chest pain-free and hemodynamically stable.  -No additional cardiac studies warranted.  -Our status and son-who is the patient's power of , does not wish to have additional cardiac evaluation or treatment at this time.    #2 unconfirmed prior history of PAF; currently maintained in sinus rhythm on metoprolol and has been on oral anticoagulant therapy with Coumadin  -Questionable indication for continuing oral anticoagulation in view of degree of dementia and falls risk in a patient who is currently maintained in sinus rhythm      Recommendation: Discharged back to [facility] today for supportive care only.    I discussed the patients findings and my recommendations with patient and patient's son who is at bedside throughout my exam as well as the patient's bedside nurse for coordination of care.  I also called the hospitalist/attending and left message on his cell phone the patient would not need additional cardiac evaluation and could be discharged back to nursing [place] today.    HOUSTON Bhardwaj MD  08/07/19  11:33 AM

## 2019-08-07 NOTE — PROGRESS NOTES
"[facility]      Patient Care Team:  Nellie Mcclendon MD as PCP - General (Geriatric Medicine)        Chief Complaint:  cp    Subjective: pT C/O 1 HR OF 10/10 EPIGASTRIC PAIN EARLIER TODAY. nO AGGRAVATING OR ALLEVIATING FACTORS. pOS soa, no n/v, nO DIAPHORESIS. pT MAY HAVE H/O AFIB                 Objective: no further CP        Vital Signs  Temp:  [97.3 °F (36.3 °C)-97.7 °F (36.5 °C)] 97.3 °F (36.3 °C)  Heart Rate:  [61-75] 61  Resp:  [14-17] 17  BP: (147-175)/(70-82) 153/81  Oxygen Therapy  SpO2: 93 %  Pulse Oximetry Type: Intermittent  Device (Oxygen Therapy): room air  Flowsheet Rows      First Filed Value   Admission Height  170.2 cm (67\") Documented at 08/06/2019 1256   Admission Weight  68 kg (150 lb) Documented at 08/06/2019 1256        Intake & Output (last 3 days)       08/04 0701 - 08/05 0700 08/05 0701 - 08/06 0700 08/06 0701 - 08/07 0700 08/07 0701 - 08/08 0700    Urine (mL/kg/hr)   200     Total Output   200     Net   -200             Urine Unmeasured Occurrence   1 x         Lines, Drains & Airways    Active LDAs     Name:   Placement date:   Placement time:   Site:   Days:    Peripheral IV 08/06/19 1351 Right Wrist   08/06/19    1351    Wrist   less than 1                Physical Exam:    General Appearance:    Alert, cooperative, in no acute distress   Head:    Normocephalic, without obvious abnormality, atraumatic   Eyes:            Lids and lashes normal, conjunctivae and sclerae normal, no   icterus, no pallor, corneas clear, PERRLA   Back:     No kyphosis present, no scoliosis present, no skin lesions,      erythema or scars, no tenderness to percussion or                   palpation,   range of motion normal   Lungs:     Clear to auscultation,respirations regular, even and                  unlabored    Heart:    Regular rhythm and normal rate, normal S1 and S2, no            murmur, no gallop, no rub, no click   Chest Wall:    No abnormalities observed   Abdomen:     " Normal bowel sounds, no masses, no organomegaly, soft        non-tender, non-distended, no guarding, no rebound                tenderness   Extremities:   Moves all extremities well, no edema, no cyanosis, no             redness       Results Review:       Lab Results (last 24 hours)     Procedure Component Value Units Date/Time    Troponin [[id number]]  (Normal) Collected:  08/07/19 0428    Specimen:  Blood Updated:  08/07/19 0551     Troponin I <0.030 ng/mL     Narrative:       Troponin I Reference Range:    0.00-0.03  Negative.  Repeat testing in 4-6 hours if clinically indicated.    0.04-0.29  Suspicious for myocardial injury. Serial measurements and clinical  correlation may be necessary to confirm or exclude diagnosis of acute  coronary syndrome.  Repeat testing in 4-6 hours if indicated.     >0.29 Consistent with myocardial injury.  Recommend clinical and laboratory correlation.     Results my be falsely decreased if patient taking Biotin.     CBC & Differential [[id number]] Collected:  08/07/19 0120    Specimen:  Blood Updated:  08/07/19 0224    Narrative:       The following orders were created for panel order CBC & Differential.  Procedure                               Abnormality         Status                     ---------                               -----------         ------                     Scan Slide[[id number]]                                       Final result               CBC Auto Differential[[id number]]        Normal              Final result                 Please view results for these tests on the individual orders.    CBC Auto Differential [[id number]]  (Normal) Collected:  08/07/19 0120    Specimen:  Blood Updated:  08/07/19 0224     WBC 6.10 10*3/mm3      RBC 4.78 10*6/mm3      Hemoglobin 15.0 g/dL      Hematocrit 43.9 %      MCV 91.9 fL      MCH 31.4 pg      MCHC 34.2 g/dL      RDW 14.1 %      RDW-SD 45.1 fl      MPV 8.5 fL      Platelets 164 10*3/mm3     Scan Slide [[id number]]  Collected:  08/07/19 0120    Specimen:  Blood Updated:  08/07/19 0224     Scan Slide --     Comment: See Manual Differential Results       Manual Differential [[id number]]  (Normal) Collected:  08/07/19 0120    Specimen:  Blood Updated:  08/07/19 0224     Neutrophil % 55.0 %      Lymphocyte % 30.0 %      Monocyte % 7.0 %      Eosinophil % 4.0 %      Basophil % 1.0 %      Bands %  2.0 %      Atypical Lymphocyte % 1.0 %      Neutrophils Absolute 3.48 10*3/mm3      Lymphocytes Absolute 1.83 10*3/mm3      Monocytes Absolute 0.43 10*3/mm3      Eosinophils Absolute 0.24 10*3/mm3      Basophils Absolute 0.06 10*3/mm3      RBC Morphology Normal     WBC Morphology Normal     Platelet Morphology Normal    TSH [[id number]]  (Normal) Collected:  08/07/19 0120    Specimen:  Blood Updated:  08/07/19 0212     TSH 3.260 mIU/mL      Comment: Results may be falsely decreased if patient taking Biotin.       Troponin [[id number]]  (Normal) Collected:  08/07/19 0120    Specimen:  Blood Updated:  08/07/19 0200     Troponin I <0.030 ng/mL     Narrative:       Troponin I Reference Range:    0.00-0.03  Negative.  Repeat testing in 4-6 hours if clinically indicated.    0.04-0.29  Suspicious for myocardial injury. Serial measurements and clinical  correlation may be necessary to confirm or exclude diagnosis of acute  coronary syndrome.  Repeat testing in 4-6 hours if indicated.     >0.29 Consistent with myocardial injury.  Recommend clinical and laboratory correlation.     Results my be falsely decreased if patient taking Biotin.     Comprehensive Metabolic Panel [[id number]]  (Abnormal) Collected:  08/07/19 0120    Specimen:  Blood Updated:  08/07/19 0200     Glucose 122 mg/dL      BUN 22 mg/dL      Creatinine 0.90 mg/dL      Sodium 135 mmol/L      Potassium 3.8 mmol/L      Chloride 98 mmol/L      CO2 24.0 mmol/L      Calcium 8.4 mg/dL      Total Protein 5.7 g/dL      Albumin 3.20 g/dL      ALT (SGPT) 12 U/L      AST (SGOT) 16 U/L      Alkaline  Phosphatase 99 U/L      Total Bilirubin 1.0 mg/dL      eGFR Non African Amer 58 mL/min/1.73      Globulin 2.5 gm/dL      A/G Ratio 1.3 g/dL      BUN/Creatinine Ratio 24.4     Anion Gap 16.8 mmol/L     Narrative:       The MDRD GFR formula is only valid for adults with stable renal function between ages 18 and 70.    Protime-INR [[id number]]  (Normal) Collected:  08/07/19 0120    Specimen:  Blood Updated:  08/07/19 0143     Protime 20.0 Seconds      INR 2.07    POC Glucose Once [[id number]]  (Abnormal) Collected:  08/06/19 1955    Specimen:  Blood Updated:  08/06/19 1956     Glucose 185 mg/dL      Comment: Serial Number: [id number]:  [id number]       Protime-INR [[id number]]  (Normal) Collected:  08/06/19 1732    Specimen:  Blood Updated:  08/06/19 1942     Protime 21.1 Seconds      INR 2.19    Troponin [[id number]]  (Normal) Collected:  08/06/19 1350    Specimen:  Blood Updated:  08/06/19 1426     Troponin I <0.030 ng/mL     Narrative:       Troponin I Reference Range:    0.00-0.03  Negative.  Repeat testing in 4-6 hours if clinically indicated.    0.04-0.29  Suspicious for myocardial injury. Serial measurements and clinical  correlation may be necessary to confirm or exclude diagnosis of acute  coronary syndrome.  Repeat testing in 4-6 hours if indicated.     >0.29 Consistent with myocardial injury.  Recommend clinical and laboratory correlation.     Results my be falsely decreased if patient taking Biotin.     Comprehensive Metabolic Panel [[id number]]  (Abnormal) Collected:  08/06/19 1350    Specimen:  Blood Updated:  08/06/19 1424     Glucose 117 mg/dL      BUN 21 mg/dL      Creatinine 1.10 mg/dL      Sodium 134 mmol/L      Potassium 3.9 mmol/L      Chloride 97 mmol/L      CO2 25.0 mmol/L      Calcium 8.9 mg/dL      Total Protein 6.5 g/dL      Albumin 3.70 g/dL      ALT (SGPT) 13 U/L      AST (SGOT) 20 U/L      Alkaline Phosphatase 112 U/L      Total Bilirubin 0.9 mg/dL      eGFR Non African Amer 46 mL/min/1.73       Globulin 2.8 gm/dL      A/G Ratio 1.3 g/dL      BUN/Creatinine Ratio 19.1     Anion Gap 15.9 mmol/L     Narrative:       The MDRD GFR formula is only valid for adults with stable renal function between ages 18 and 70.    CBC & Differential [[id number]] Collected:  08/06/19 1350    Specimen:  Blood Updated:  08/06/19 1413    Narrative:       The following orders were created for panel order CBC & Differential.  Procedure                               Abnormality         Status                     ---------                               -----------         ------                     CBC Auto Differential[[id number]]        Abnormal            Final result                 Please view results for these tests on the individual orders.    CBC Auto Differential [[id number]]  (Abnormal) Collected:  08/06/19 1350    Specimen:  Blood Updated:  08/06/19 1413     WBC 7.00 10*3/mm3      Comment: Result checked         RBC 5.15 10*6/mm3      Hemoglobin 16.3 g/dL      Hematocrit 47.8 %      MCV 92.8 fL      MCH 31.6 pg      MCHC 34.1 g/dL      RDW 14.6 %      RDW-SD 47.3 fl      MPV 8.0 fL      Platelets 192 10*3/mm3      Neutrophil % 65.5 %      Lymphocyte % 21.9 %      Monocyte % 7.3 %      Eosinophil % 4.5 %      Basophil % 0.8 %      Neutrophils, Absolute 4.60 10*3/mm3      Lymphocytes, Absolute 1.50 10*3/mm3      Monocytes, Absolute 0.50 10*3/mm3      Eosinophils, Absolute 0.30 10*3/mm3      Basophils, Absolute 0.10 10*3/mm3      nRBC 0.0 /100 WBC           Imaging Results (last 24 hours)     Procedure Component Value Units Date/Time    XR Chest 1 View [[id number]] Collected:  08/06/19 1424     Updated:  08/06/19 1428    Narrative:       DATE OF EXAM:  8/6/2019 2:19 PM     PROCEDURE:  XR CHEST 1 VW-     INDICATIONS:  Chest pain and shortness of breath     COMPARISON:  No Comparisons Available     TECHNIQUE:   Single radiographic AP view of the chest was obtained.     FINDINGS:  Cardiac size is enlarged. The pulmonary  vascular markings of the chest  appear normal. Lungs appear clear with no acute infiltrates. There is  biapical pleural thickening present.        Impression:       Mild cardiomegaly with evidence of biapical pleural thickening. No acute  process identified.     Electronically Signed By-Man Andrews On:8/6/2019 2:26 PM  This report was finalized on [id number] by  Man Andrews, .                                   I reviewed the patient's new clinical results.          ECG/EMG Results (most recent)     Procedure Component Value Units Date/Time    ECG 12 Lead [[id number]] Collected:  08/06/19 1303     Updated:  08/06/19 1653    Narrative:       HEART RATE= 61  bpm  RR Interval= 988  ms  DE Interval= 214  ms  P Horizontal Axis= -25  deg  P Front Axis= 87  deg  QRSD Interval= 92  ms  QT Interval= 430  ms  QRS Axis= 79  deg  T Wave Axis= 56  deg  - ABNORMAL ECG -  Sinus rhythm  Borderline prolonged DE interval  Anterior infarct, old  Electronically Signed By: Gamaliel Zayas (SAMUEL) 06-Aug-2019 16:53:09  Date and Time of Study: 2019-08-06 13:03:12    ECG 12 Lead [[id number]] Collected:  08/07/19 0128     Updated:  08/07/19 0130    Narrative:       HEART RATE= 63  bpm  RR Interval= 952  ms  DE Interval= 233  ms  P Horizontal Axis=   deg  P Front Axis= 91  deg  QRSD Interval= 85  ms  QT Interval= 437  ms  QRS Axis= 33  deg  T Wave Axis= 43  deg  - ABNORMAL ECG -  Sinus rhythm  Prolonged DE interval  Anterior infarct, old  Electronically Signed By:   Date and Time of Study: 2019-08-07 01:28:10            Medication Review:       Current Facility-Administered Medications:   •  acetaminophen (TYLENOL) tablet 650 mg, 650 mg, Oral, Q6H PRN, Herber Hoyt Jr., MD  •  aspirin chewable tablet 324 mg, 324 mg, Oral, Daily, Herber Hoyt Jr., MD, 324 mg at 08/06/19 6505  •  docusate sodium (COLACE) capsule 100 mg, 100 mg, Oral, BID PRN, Herber Hoyt Jr., MD  •  famotidine (PEPCID) tablet 20 mg, 20 mg, Oral, BID, Herber Hoyt Jr.  MD, 20 mg at 08/06/19 2059  •  furosemide (LASIX) tablet 40 mg, 40 mg, Oral, Daily, Herber Hoyt Jr., MD  •  levothyroxine (SYNTHROID, LEVOTHROID) tablet 50 mcg, 50 mcg, Oral, Q AM, Herber Hoyt Jr., MD, 50 mcg at 08/07/19 0616  •  memantine (NAMENDA) tablet 10 mg, 10 mg, Oral, Q24H, Herber Hoyt Jr., MD  •  metoprolol succinate XL (TOPROL-XL) 24 hr tablet 50 mg, 50 mg, Oral, Q24H, Herber Hoyt Jr., MD  •  nitroglycerin (NITROSTAT) SL tablet 0.4 mg, 0.4 mg, Sublingual, Q5 Min PRN, Kristofer-Short, Eunice, APRN, 0.4 mg at 08/07/19 0111  •  Pharmacy to dose warfarin, , Does not apply, Continuous PRN, Herber Hoyt Jr., MD  •  sodium chloride 0.9 % flush 3 mL, 3 mL, Intravenous, Q12H, Herber Hoyt Jr., MD, 3 mL at 08/06/19 2101  •  sodium chloride 0.9 % flush 3-10 mL, 3-10 mL, Intravenous, PRN, Herber Hoyt Jr., MD  •  warfarin (COUMADIN) tablet 4 mg, 4 mg, Oral, Once, Herber Hoyt Jr., MD  •  warfarin (COUMADIN) tablet 4 mg, 4 mg, Oral, Daily, Herber Hoyt Jr., MD    I have reviewed the patient's current medication list    Assessment/Plan     Active Problems:    Precordial pain- await cardiology eval, myoview pending      Plan for disposition:home    Herber Hoyt Jr., MD  08/07/19  7:23 AM      Time:

## 2019-08-07 NOTE — NURSING NOTE
Patient reports chest pain at 0109. Reports pain as an 8. Nurse checked /81 at 0111 and gave first Nitro. Called hospitalist group for nitro order. DAISHA Dawson called back and placed orders for nitro and troponin.

## 2019-08-07 NOTE — PROGRESS NOTES
Discharge Planning Assessment   [facility]     Patient Name: Peggy Hopkins  MRN: [id number]  Today's Date: 8/7/2019    Admit Date: 8/6/2019    Discharge Needs Assessment     Row Name 08/07/19 1106       Living Environment    Lives With  facility resident LTC at [facility]    Current Living Arrangements  extended care facility    Primary Care Provided by  other (see comments) LTC       Resource/Environmental Concerns    Transportation Concerns  car, none son       Transition Planning    Patient/Family Anticipates Transition to  long term care facility       Discharge Needs Assessment    Readmission Within the Last 30 Days  no previous admission in last 30 days    Concerns to be Addressed  no discharge needs identified    Equipment Currently Used at Home  walker, standard    Equipment Needed After Discharge  none    Discharge Facility/Level of Care Needs  nursing facility, basic        Discharge Plan     Row Name 08/07/19 1109       Plan    Plan  Return to [facility]    Patient/Family in Agreement with Plan  yes       Demographic Summary     Row Name 08/07/19 1106       General Information    Admission Type  observation    Required Notices Provided  Observation Status Notice    Referral Source  admission list    Preferred Language  English        Functional Status     Row Name 08/07/19 1106       Functional Status    Usual Activity Tolerance  good       Mental Status    General Appearance WDL  WDL     Spoke with sons at bedside, plan to return to [facility].      Nitza METCALFN,CCM

## 2019-08-07 NOTE — PLAN OF CARE
Problem: Fall Risk (Adult)  Goal: Identify Related Risk Factors and Signs and Symptoms  Outcome: Outcome(s) achieved Date Met: 08/07/19    Goal: Absence of Fall  Outcome: Ongoing (interventions implemented as appropriate)  No fall during shift. Patient compliant with falls precautions.    Problem: Skin Injury Risk (Adult)  Goal: Identify Related Risk Factors and Signs and Symptoms  Outcome: Outcome(s) achieved Date Met: 08/07/19    Goal: Skin Health and Integrity  Outcome: Ongoing (interventions implemented as appropriate)  No skin issues noted on assessment.    Problem: Cardiac: ACS (Acute Coronary Syndrome) (Adult)  Goal: Signs and Symptoms of Listed Potential Problems Will be Absent, Minimized or Managed (Cardiac: ACS)  Outcome: Ongoing (interventions implemented as appropriate)  Patient was able to demonstrate using call light when she called for nurse to help with her CP at 0100 on 8/7.

## 2019-08-09 NOTE — PROGRESS NOTES
Case Management Discharge Note    Final Note: Nena Little           Final Discharge Disposition Code: 03 - skilled nursing facility (SNF)

## 2019-11-09 ENCOUNTER — LAB REQUISITION (OUTPATIENT)
Dept: LAB | Facility: HOSPITAL | Age: 84
End: 2019-11-09

## 2019-11-09 DIAGNOSIS — Z00.00 ROUTINE GENERAL MEDICAL EXAMINATION AT A HEALTH CARE FACILITY: ICD-10-CM

## 2019-11-09 LAB
INR PPP: 0.87 (ref 0.9–1.1)
PROTHROMBIN TIME: 11.5 SECONDS (ref 11.7–14.2)

## 2019-11-09 PROCEDURE — 85610 PROTHROMBIN TIME: CPT

## 2020-08-08 PROCEDURE — 85610 PROTHROMBIN TIME: CPT

## 2020-08-09 ENCOUNTER — LAB REQUISITION (OUTPATIENT)
Dept: LAB | Facility: HOSPITAL | Age: 85
End: 2020-08-09

## 2020-08-09 DIAGNOSIS — Z00.00 ROUTINE GENERAL MEDICAL EXAMINATION AT A HEALTH CARE FACILITY: ICD-10-CM

## 2020-08-09 LAB
INR PPP: 3.14 (ref 0.9–1.1)
PROTHROMBIN TIME: 31.2 SECONDS (ref 11.7–14.2)
